# Patient Record
Sex: FEMALE | Race: WHITE | NOT HISPANIC OR LATINO | Employment: FULL TIME | URBAN - METROPOLITAN AREA
[De-identification: names, ages, dates, MRNs, and addresses within clinical notes are randomized per-mention and may not be internally consistent; named-entity substitution may affect disease eponyms.]

---

## 2024-05-20 ENCOUNTER — OFFICE VISIT (OUTPATIENT)
Dept: URGENT CARE | Facility: CLINIC | Age: 24
End: 2024-05-20
Payer: COMMERCIAL

## 2024-05-20 VITALS
DIASTOLIC BLOOD PRESSURE: 67 MMHG | WEIGHT: 155 LBS | SYSTOLIC BLOOD PRESSURE: 103 MMHG | BODY MASS INDEX: 23.49 KG/M2 | RESPIRATION RATE: 18 BRPM | HEART RATE: 102 BPM | HEIGHT: 68 IN | OXYGEN SATURATION: 98 % | TEMPERATURE: 98 F

## 2024-05-20 DIAGNOSIS — S83.92XA SPRAIN OF LEFT KNEE, UNSPECIFIED LIGAMENT, INITIAL ENCOUNTER: Primary | ICD-10-CM

## 2024-05-20 DIAGNOSIS — M25.562 PAIN AND SWELLING OF LEFT KNEE: ICD-10-CM

## 2024-05-20 DIAGNOSIS — M25.462 EFFUSION OF LEFT KNEE JOINT: ICD-10-CM

## 2024-05-20 DIAGNOSIS — M25.462 PAIN AND SWELLING OF LEFT KNEE: ICD-10-CM

## 2024-05-20 PROCEDURE — 73562 X-RAY EXAM OF KNEE 3: CPT | Mod: LT,S$GLB,, | Performed by: RADIOLOGY

## 2024-05-20 PROCEDURE — 99203 OFFICE O/P NEW LOW 30 MIN: CPT | Mod: S$GLB,,, | Performed by: FAMILY MEDICINE

## 2024-05-20 RX ORDER — NORGESTIMATE AND ETHINYL ESTRADIOL 7DAYSX3 28
KIT ORAL
COMMUNITY

## 2024-05-20 RX ORDER — CLORAZEPATE DIPOTASSIUM 7.5 MG/1
7.5 TABLET ORAL
COMMUNITY

## 2024-05-20 RX ORDER — NORTRIPTYLINE HYDROCHLORIDE 10 MG/1
10 CAPSULE ORAL NIGHTLY
COMMUNITY
Start: 2024-04-18

## 2024-05-20 NOTE — PATIENT INSTRUCTIONS
Thank you for allowing our team to take care of you today.  The diagnosis is left knee sprain.  Xray of the knee was done today and read by radiology as resulted below.   Suspect possible injury to the ligaments.  Continue knee brace for support.  Continue local ice for swelling.  No aggravating activities.  Will have you followup with sports medicine/orthopedist for further management. A appointment request has been placed.   Immediate medical provider evaluation if worsens.  Followup here as needed.       Narrative & Impression  EXAM: XR KNEE 3 VIEW LEFT     CLINICAL HISTORY: Knee pain     FINDINGS:  No fracture or dislocation is visible.  There is density consistent with a small joint effusion visible on the lateral view in the suprapatellar bursa.        Impression:     1.  No evidence of osseous injury.  2.  Small effusion visible on the lateral view.     Finalized on: 5/20/2024 2:19 PM By:  Bay HARVEY# [id number]      2024-05-20 14:21:10.386    WES

## 2024-05-20 NOTE — PROGRESS NOTES
"Subjective:      Patient ID: Leanna Escobar is a 24 y.o. female.    Vitals:  height is 5' 8" (1.727 m) and weight is 70.3 kg (154 lb 15.7 oz). Her temperature is 97.6 °F (36.4 °C). Her blood pressure is 103/67 and her pulse is 102. Her respiration is 18 and oxygen saturation is 98%.     Chief Complaint: Knee Injury    23 y/o female here for left knee pain x 1 week with accompanying swelling. No known history of injury or fall. Causing a limp. Doesn't seem as stable. Using a brace. Also, she has taken ibuprofen with no relief. Last dose was at 11am. No prior hx of problems with her left knee.     Knee Injury  This is a new problem. The current episode started in the past 7 days. The problem has been unchanged. Associated symptoms include arthralgias and joint swelling.       Constitution: Negative.   Cardiovascular: Negative.    Respiratory: Negative.     Musculoskeletal:  Positive for joint pain, joint swelling and history of spine disorder. Negative for trauma.   Skin: Negative.    Psychiatric/Behavioral: Negative.        Objective:     Physical Exam   Constitutional: She is oriented to person, place, and time.   Neck: Neck supple.   Cardiovascular: Normal rate, regular rhythm and normal pulses.   Pulmonary/Chest: Effort normal.   Abdominal: Normal appearance.   Musculoskeletal:      Comments: Left knee--No visible discoloration/bruising. Mild anterior knee swelling. No warmth. Tenderness over anterior, superior, lateral and medial surfaces of this knee. Currently no specific joint line tenderness. Pain with active extension and flexion but able to complete full ROM. Pain with weight bearing. Two healing bruised areas lower anterior leg. Nontender. Rest of the lower extremity with no acute abnormality.    Neurological: no focal deficit. She is alert and oriented to person, place, and time.   Skin: Skin is warm.         Comments: Normal turgor. No acute focal rash.    Psychiatric: Her behavior is normal. Mood, " judgment and thought content normal.   Nursing note and vitals reviewed.      Assessment:     1. Sprain of left knee, unspecified ligament, initial encounter    2. Pain and swelling of left knee    3. Effusion of left knee joint        Plan:       Sprain of left knee, unspecified ligament, initial encounter  -     Ambulatory referral/consult to Sports Medicine    Pain and swelling of left knee  -     XR KNEE 3 VIEW LEFT; Future; Expected date: 05/20/2024  -     Ambulatory referral/consult to Sports Medicine    Effusion of left knee joint  -     Ambulatory referral/consult to Sports Medicine          Review of patient's allergies indicates:   Allergen Reactions    Adhesive Blisters, Hives, Itching, Rash and Swelling    Beeswax Hives, Rash and Swelling    Triclosan Itching     SUMMARY: See hpi. Suspect ligament/meniscal injury. Continue knee brace, limiting aggravating activity. Sports medicine evaluation--appreciate. Only takes Ibuprofen if she has to since easy bruiser. Handout on knee sprain with general information given as well.     Patient Instructions   Thank you for allowing our team to take care of you today.  The diagnosis is left knee sprain.  Xray of the knee was done today and read by radiology as resulted below.   Suspect possible injury to the ligaments.  Continue knee brace for support.  Continue local ice for swelling.  No aggravating activities.  Will have you followup with sports medicine/orthopedist for further management. A appointment request has been placed.   Immediate medical provider evaluation if worsens.  Followup here as needed.       Narrative & Impression  EXAM: XR KNEE 3 VIEW LEFT     CLINICAL HISTORY: Knee pain     FINDINGS:  No fracture or dislocation is visible.  There is density consistent with a small joint effusion visible on the lateral view in the suprapatellar bursa.        Impression:     1.  No evidence of osseous injury.  2.  Small effusion visible on the lateral view.      Finalized on: 5/20/2024 2:19 PM By:  Bay HARVEY# [id number]      2024-05-20 14:21:10.386    WES

## 2024-05-23 ENCOUNTER — OFFICE VISIT (OUTPATIENT)
Dept: SPORTS MEDICINE | Facility: CLINIC | Age: 24
End: 2024-05-23
Payer: COMMERCIAL

## 2024-05-23 ENCOUNTER — HOSPITAL ENCOUNTER (OUTPATIENT)
Dept: RADIOLOGY | Facility: HOSPITAL | Age: 24
Discharge: HOME | End: 2024-05-23
Attending: ORTHOPAEDIC SURGERY
Payer: COMMERCIAL

## 2024-05-23 VITALS — BODY MASS INDEX: 23.49 KG/M2 | WEIGHT: 155 LBS | HEIGHT: 68 IN

## 2024-05-23 DIAGNOSIS — M25.562 LEFT KNEE PAIN, UNSPECIFIED CHRONICITY: ICD-10-CM

## 2024-05-23 DIAGNOSIS — M22.2X2 PATELLOFEMORAL PAIN SYNDROME OF LEFT KNEE: Primary | ICD-10-CM

## 2024-05-23 DIAGNOSIS — M76.32 ILIOTIBIAL BAND SYNDROME OF LEFT SIDE: ICD-10-CM

## 2024-05-23 PROCEDURE — 3008F BODY MASS INDEX DOCD: CPT | Mod: CPTII,S$GLB,, | Performed by: ORTHOPAEDIC SURGERY

## 2024-05-23 PROCEDURE — 99999 PR PBB SHADOW E&M-EST. PATIENT-LVL III: CPT | Mod: PBBFAC,,, | Performed by: ORTHOPAEDIC SURGERY

## 2024-05-23 PROCEDURE — 73564 X-RAY EXAM KNEE 4 OR MORE: CPT | Mod: 26,LT,, | Performed by: RADIOLOGY

## 2024-05-23 PROCEDURE — 20610 DRAIN/INJ JOINT/BURSA W/O US: CPT | Mod: LT,S$GLB,, | Performed by: ORTHOPAEDIC SURGERY

## 2024-05-23 PROCEDURE — 73562 X-RAY EXAM OF KNEE 3: CPT | Mod: TC,PN,RT

## 2024-05-23 PROCEDURE — 1159F MED LIST DOCD IN RCRD: CPT | Mod: CPTII,S$GLB,, | Performed by: ORTHOPAEDIC SURGERY

## 2024-05-23 PROCEDURE — 99204 OFFICE O/P NEW MOD 45 MIN: CPT | Mod: 25,S$GLB,, | Performed by: ORTHOPAEDIC SURGERY

## 2024-05-23 RX ADMIN — METHYLPREDNISOLONE ACETATE 40 MG: 40 INJECTION, SUSPENSION INTRA-ARTICULAR; INTRALESIONAL; INTRAMUSCULAR; SOFT TISSUE at 10:05

## 2024-05-23 NOTE — PROCEDURES
Large Joint Aspiration/Injection: L knee    Date/Time: 5/23/2024 10:30 AM    Performed by: Roque Stevenson MD  Authorized by: Roque Stevenson MD    Consent Done?:  Yes (Verbal)  Indications:  Joint swelling and pain  Site marked: the procedure site was marked    Timeout: prior to procedure the correct patient, procedure, and site was verified    Prep: patient was prepped and draped in usual sterile fashion      Local anesthesia used?: Yes    Anesthesia:  Local infiltration  Local anesthetic:  Bupivacaine 0.5% without epinephrine, lidocaine 1% without epinephrine and topical anesthetic    Details:  Needle Size:  22 G  Ultrasonic Guidance for needle placement?: No    Approach:  Superior  Location:  Knee  Site:  L knee  Medications:  40 mg methylPREDNISolone acetate 40 mg/mL  Patient tolerance:  Patient tolerated the procedure well with no immediate complications     2cc 1% lidocaine plain, 2cc 0.5% marcaine plain, 0.5cc 80mg methylprednisolone    Procedure Note:  We discussed the risk and benefits of injections, including pain, infection, bleeding, damage to adjacent structures, risk of reaction to injection. We discussed the steroid/cortisone injections will not heal the problem but mat help decrease inflammation and help with symptoms. We discussed the risk of repeated injections. The patient expressed understanding and wanted to proceed with the injection. We performed a timeout to verify the proper patient, proper procedure, and the proper site. The injection site was prepared in a sterile fashion. The patient tolerated it well and there were no complication. We did discuss with the patient that steroid injections can cause some increase in blood sugar and blood pressure for up to a week after the injection.

## 2024-05-23 NOTE — PATIENT INSTRUCTIONS
Assessment:  Leanna Escobar is a  24 y.o. female    diadetic with a chief complaint of Injury of the Left Knee    L knee PFPS  Patella Dagmar   IT band syndrome    Encounter Diagnoses   Name Primary?    Left knee pain, unspecified chronicity     Patellofemoral pain syndrome of left knee Yes    Iliotibial band syndrome of left side       Plan:  I recommend proceeding with left knee intra-articular corticosteroid injection. The patient is in agreement with this plan. 40 administered today  Procedure performed today and patient tolerated the procedure well with no immediate complications.   Recommend PT to improve strength and function  Continue OTC ibuprofen as needed  Work with PT Julio on IT Band syndrome today after visit.    Follow-up: 6 weeks or sooner if there are any problems between now and then.    Leave Review:   Google: Leave Google Review  Healthgrades: Leave Healthgrades Review    After Hours Number: [phone]        DEFORMITY/INFLAMMATION/JOINT SWELLING/PAIN/TENDERNESS

## 2024-05-23 NOTE — PROGRESS NOTES
Patient ID: Leanna Escobar  YOB: 2000  MRN: [id number]    Chief Complaint: Injury of the Left Knee    Referred By: PCP    History of Present Illness: Leanna Escobar is a  24 y.o. female    diadetic with a chief complaint of Injury of the Left Knee    Patient presents today for evaluation of left knee pain and swelling that has been present for 10 days without any specific injury. She denies any known hx of autoimmune disorders. She has an extensive hx of spinal fusions dating back to 2019, these were done in [facility]. She reports she was at work when she started to notice the pain while walking around. She denies any twisting or falls, endorses insidious onset. She has taken ibuprofen as needed for the pain and iced a few times with mild improvement. She has the most pain with general weightbearing and walking longer distances at work. She has not had any CSI's or formal PT for this issue.     HPI    Past Medical History:   Past Medical History:   Diagnosis Date    Breakdown (mechanical) of cranial or spinal infusion catheter, initial encounter     History of tonsillectomy      Past Surgical History:   Procedure Laterality Date    SPINAL FUSION      TONSILLECTOMY       Family History   Problem Relation Name Age of Onset    Hyperlipidemia Father       Social History     Socioeconomic History    Marital status:    Tobacco Use    Smoking status: Every Day     Types: Vaping with nicotine     Passive exposure: Never    Smokeless tobacco: Never   Substance and Sexual Activity    Alcohol use: Yes    Drug use: Never    Sexual activity: Yes     Partners: Male     Birth control/protection: I.U.D.     Medication List with Changes/Refills   Current Medications    CLORAZEPATE (TRANXENE) 3.75 MG TAB    Take 3.75 mg by mouth 3 (three) times daily.    CLORAZEPATE (TRANXENE) 7.5 MG TAB    Take 7.5 mg by mouth.    DULOXETINE (CYMBALTA) 60 MG CAPSULE    Take 60 mg by mouth once daily.     NORGESTIMATE-ETHINYL ESTRADIOL (ORTHO TRI-CYCLEN,TRI-SPRINTEC) 0.18/0.215/0.25 MG-35 MCG (28) TABLET    Take by mouth.    NORTRIPTYLINE (PAMELOR) 10 MG CAPSULE    Take 10 mg by mouth every evening.    ONDANSETRON (ZOFRAN) 8 MG TABLET         Review of patient's allergies indicates:   Allergen Reactions    Adhesive Blisters, Hives, Itching, Rash and Swelling    Beeswax Hives, Rash and Swelling    Triclosan Itching     ROS    Physical Exam:   Body mass index is 23.57 kg/m².  There were no vitals filed for this visit.   GENERAL: Well appearing, appropriate for stated age, no acute distress.  CARDIOVASCULAR: Pulses regular by peripheral palpation.  PULMONARY: Respirations are even and non-labored.  NEURO: Awake, alert, and oriented x 3.  PSYCH: Mood & affect are appropriate.  HEENT: Head is normocephalic and atraumatic.  Ortho/SPM Exam  Left Knee  Mild effusion noted on inspection  TTP lateral joint line, IT band, lateral patella border    ROM 0-130    MMT  Quads 5/5  Hamstring 5/5  Hip abduction 5/5    ACL stable  MCL stable  LCL stable  PCL stable    McMurrays (+)    Intact EHL, FHL, gastrocsoleus, and tibialis anterior. Sensation intact to light touch in superficial peroneal, deep peroneal, tibial, sural, and saphenous nerve distributions. Foot warm and well perfused with capillary refill of less than 2 seconds and palpable pedal pulses.      Imaging:    X-ray Knee Ortho Left with Flexion  Narrative: EXAM:  XR KNEE ORTHO LEFT WITH FLEXION    CLINICAL HISTORY:    Left knee joint pain    TECHNIQUE: 4 views of the left knee.    COMPARISON: 05/20/2024 x-ray    FINDINGS:    Bone density and architecture are normal. No acute findings.  Impression:  Negative study    Finalized on: 5/23/2024 10:53 AM By:  Sebas Pulliam MD  [facility]# [id number]      2024-05-23 10:55:43.010    BROLIVIA      Relevant imaging results reviewed and interpreted by me, discussed with the patient and / or family today.     Other Tests:       Patient  Instructions   Assessment:  Leanna Escobar is a  24 y.o. female    diadetic with a chief complaint of Injury of the Left Knee    L knee PFPS  Patella Dagmar   IT band syndrome    Encounter Diagnoses   Name Primary?    Left knee pain, unspecified chronicity     Patellofemoral pain syndrome of left knee Yes    Iliotibial band syndrome of left side       Plan:  I recommend proceeding with left knee intra-articular corticosteroid injection. The patient is in agreement with this plan. 40 administered today  Procedure performed today and patient tolerated the procedure well with no immediate complications.   Recommend PT to improve strength and function  Continue OTC ibuprofen as needed  Work with PT Julio on IT Band syndrome today after visit.    Follow-up: 6 weeks or sooner if there are any problems between now and then.    Leave Review:   Google: Leave Google Review  Healthgrades: Leave Healthgrades Review    After Hours Number: [phone]       Provider Note/Medical Decision Making:       I discussed worrisome and red flag signs and symptoms with the patient. The patient expressed understanding and agreed to alert me immediately or to go to the emergency room if they experience any of these.   Treatment plan was developed with input from the patient/family, and they expressed understanding and agreement with the plan. All questions were answered today.          Roque Stevenson MD  Orthopaedic Surgery & Sports Medicine       Disclaimer: This note was prepared using a voice recognition system and is likely to have sound alike errors within the text.     I, Fox Payne, acted as a scribe for Roque Stevenson MD for the duration of this office visit.

## 2024-05-29 ENCOUNTER — CLINICAL SUPPORT (OUTPATIENT)
Facility: HOSPITAL | Age: 24
End: 2024-05-29
Payer: COMMERCIAL

## 2024-05-29 DIAGNOSIS — R52 PAIN AGGRAVATED BY ACTIVITIES OF DAILY LIVING: ICD-10-CM

## 2024-05-29 DIAGNOSIS — R29.898 DECREASED STRENGTH OF LOWER EXTREMITY: Primary | ICD-10-CM

## 2024-05-29 DIAGNOSIS — M22.2X2 PATELLOFEMORAL PAIN SYNDROME OF LEFT KNEE: ICD-10-CM

## 2024-05-29 PROCEDURE — 97161 PT EVAL LOW COMPLEX 20 MIN: CPT | Mod: PN | Performed by: PHYSICAL THERAPIST

## 2024-05-29 PROCEDURE — 97110 THERAPEUTIC EXERCISES: CPT | Mod: PN | Performed by: PHYSICAL THERAPIST

## 2024-05-29 NOTE — PROGRESS NOTES
[facility]   Physical Therapy Initial Evaluation      Name: Leanna Escobar  Clinic Number: [id number]    Therapy Diagnosis:   Encounter Diagnosis   Name Primary?    Patellofemoral pain syndrome of left knee         Physician: Roque Stevenson MD    Physician Orders: PT Eval and Treat   Medical Diagnosis from Referral: Patellofemoral pain syndrome of left knee [M22.2X2]   Evaluation Date: 5/29/2024  Authorization Period Expiration: 12/31/2024  Plan of Care Expiration: 8/29/24  Progress Note Due: 6/29/24  Visit # / Visits authorized: 1/ 1     FOTO:  Goal: 71%  -Intake: 40%  -Status: incomplete  -Discharge: incomplete    Precautions: Standard     Time In: 1115  Time Out: 1215  Total Appointment Time (timed & untimed codes): 60 minutes    Subjective     Date of onset: 2 weeks ago    History of current condition - Leanna reports: Anterior knee pain. Better since steroid shot. Pain started after walking at work. History of 3 spinal surgeries. Is now now fused T3- 12. Most recent spine surgery was in 2019. Back hurts all day. Deep achy pain. She also has a history of nerve pain. Upper/middle back.    Back hurts all the time.    Falls:     Imaging: [] Xray [] MRI [] CT: Performed on:     Pain:  Current 4/10, worst 6/10, best 0/10   Location: [] Right   [x] Left:    Description: tenderness, sharp  Aggravating Factors: Stairs, squatting.  Easing Factors: activity avoidance, rest    Prior Therapy: Yes  Social History:    Occupation: Works at [employer]  Prior Level of Function: Limited due to back pain. She used to do some weight lifting, but stopped because of back pain.  Current Level of Function: Walks a good bit at work, normal walking throughout the day.     Patients goals: Get back to normal function. Potentially get back to lifting.     Medical History:   Past Medical History:   Diagnosis Date    Breakdown (mechanical) of cranial or spinal infusion catheter, initial encounter      History of tonsillectomy        Surgical History:   Leanna Escobar  has a past surgical history that includes Spinal fusion and Tonsillectomy.    Medications:   Leanna has a current medication list which includes the following prescription(s): clorazepate, clorazepate, duloxetine, norgestimate-ethinyl estradiol, nortriptyline, and ondansetron.    Allergies:   Review of patient's allergies indicates:   Allergen Reactions    Adhesive Blisters, Hives, Itching, Rash and Swelling    Beeswax Hives, Rash and Swelling    Triclosan Itching        Objective      Observation:   POSTURE:    Apparent lateral tibial torsion on R vs more medial tibial torsion on L  GAIT:   L foot in toeing      FUNCTIONAL MOVEMENT PATTERNS  Movement Analysis Notes   Squat []Functional  [x]Dysfunctional:  [x]Painful  []Non-Painful    Asymmetrical       MOTOR CONTROL TESTS:    Right  (spine) Left  Goal   Lower extremity      Active straight leg raise [] Positive  [x] Negative  Comments: [x] Positive  [] Negative  Comments: Negative B    Prone knee bend [] Positive  [x] Negative  Comments: [x] Positive  [] Negative  Comments: Negative B    Prone hip rotation [] Positive  [x] Negative  Comments: [x] Positive  [] Negative  Comments:    Bent knee fallout [] Positive  [x] Negative  Comments: [x] Positive  [] Negative  Comments: Negative B    Supine march [] Positive  [x] Negative  Comments: [x] Positive  [] Negative  Comments:    Seated knee extension [] Positive  [x] Negative  Comments: [x] Positive  [] Negative  Comments: Negative B         Balance:    RIGHT   LEFT   Goal   Single Leg EC 10 s EC 10s 30 seconds      ,   Lower extremity reflexes:  Reflex Left Right   Patella (L2-4) 3+ 3+   Hamstring (L5) 3+ 3+   Achilles (S1) 3+ 3+   Murphy Negative Negative   Babinski Negative Negative   Clonus Negative Negative     ,   Lower extremity myotomes  Neuro Testing Right   Left     L2 (hip flexion) 5/5 5/5   L3 (knee extension) 5/5 5/5   L4 (ankle DF) 5/5 5/5  "  L5 (great toe extension) 5/5 5/5   S1 (plantarflexion) 5/5 5/5    ,     Lower  Limb Neurodynamic testing:  NT today    RANGE OF MOTION:   Lumbar ROM Right  (spine) Left   Pain/Dysfunction with Movement Goal   Lumbar Flexion (60º) 50% --- Flat back    Lumbar Extension (30º) 25% ---     Lumbar Rotation 50% 50%         Hip AROM/PROM Right Left Pain/Dysfunction with Movement Goal   Hip Flexion (120º) 130 130     Hip Extension (30º)       Hip Abduction (45º)       Hip IR (45º) 40 50     Hip ER (45º) 40 50         Knee AROM/PROM Right Left Pain/Dysfunction with Movement Goal   Knee Flexion (135º) 140 140 Discomfort    Knee Extension (0º) 5 5         Ankle/Foot AROM/PROM Right Left Pain/Dysfunction with Movement Goal   Dorsiflexion (20º OKC, 4" CKC) -5 -5     Plantarflexion (50º)       Inversion (35º) 35 35     Eversion (15º) 15 15          STRENGTH:   L/E MMT Right  (spine) Left Pain/Dysfunction with Movement Goal   Hip Flexion  4+/5 3/5 Unable to maintain test position 4+/5 B   Hip Extension  3/5 3/5 Significant lumbar extension 4+/5 B   Hip Abduction  4+/5 4-/5  4+/5 B   Knee Extension 5/5 4+/5 Pain 5/5 B   Knee Flexion 5/5 4+/5  5/5 B   Hip ER 4+/5 4+/5  4+/5 B   Ankle PF 28 25  5/5 B        MUSCLE LENGTH:   Muscle Tested  Right Left  Limitation Goal   ITB / TFL [x] Normal  [] Limited [x] Normal  [] Limited  Normal B   Quadriceps [x] Normal  [] Limited [x] Normal  [] Limited  Normal B   Hamstrings  [] Normal  [] Limited [] Normal  [] Limited  Normal B         Joint mobility:  Impaired Ankle AP mobility      SPECIAL TESTS:    Right  (spine) Left  Goal   PRIYA  [] Positive    [x] Negative [x] Positive    [] Negative Negative B    FADIR  [] Positive    [x] Negative [] Positive    [x] Negative Negative B    Scour  [] Positive    [x] Negative [] Positive    [x] Negative Negative B    Elizondo Compression Test [] Positive    [x] Negative [] Positive    [x] Negative Negative B           SENSATION  [x] Intact to Light Touch "   [] Impaired:      PALPATION: Structures: Increased tenderness to palpation of:          Function:     Intake Outcome Measure for FOTO knee Survey    Therapist reviewed FOTO scores for Leanna Outlaw on 5/29/2024.   FOTO documents entered into EPIC - see Media section.    Intake Score: 40%         Treatment     Total Treatment time (time-based codes) separate from Evaluation: (10) minutes     Leanna received the treatments listed below:      CPT Code Intervention Date/Notes  5/29     Manual Therapy     TE Bracing with march 10X 10S   TE Bridge with TB 10 X 10S   TE Side lying hip abduction 10 X 10S     *** minutes of Therapeutic Exercise (TE) to develop strength, endurance, ROM, and flexibility. (28153)  *** minutes of Manual therapy (MT) to improve pain and ROM. (47230)  *** minutes of Neuromuscular Re-Education (NMR)  to improve: Balance, Coordination, Kinesthetic, Sense, Proprioception, and Posture. (55090)  *** minutes of Therapeutic Activities (TA) to improve functional performance. (75701)  Unattended Electrical Stimulation (ES) for muscle performance and/or pain modulation. (38782)  Vasopneumatic Device Therapy () for management of swelling/edema. (82664)  BFR: Blood flow restriction applied during exercise  NP: Not Performed    Patient Education and Home Exercises     Education provided: (***) minutes  {PATIENT EDUCATION (Optional):07795}  Activity Modifications: ***    Written Home Exercises Provided: yes.  Exercises were reviewed and Leanna was able to demonstrate them prior to the end of the session.  Leanna demonstrated {Desc; good/fair/poor:22609} understanding of the education provided. See EMR under Patient Instructions for exercises provided during therapy sessions.    Assessment     Leanna is a 24 y.o. female referred to outpatient Physical Therapy with a medical diagnosis of Patellofemoral pain syndrome of left knee [M22.2X2] . Clinical exam is consistent with referring diagnosis.     Problem  "List:  Lumbar extension/rotation syndrome  Hip adduction/internal rotation syndrome  Lumbopelvic-hip complex weakness/movement dysfunction  Hamstring dominance  Patella franny    Pt prognosis is Excellent  Pt will benefit from skilled outpatient Physical Therapy to address the deficits stated above and in the chart below, provide pt/family education, and to maximize pt's level of independence.     Plan of care discussed with patient: Yes  Pt's spiritual, cultural and educational needs considered and patient is agreeable to the plan of care and goals as stated below:     Anticipated Barriers for therapy: co-morbidities    Medical Necessity is demonstrated by the following   History  Co-morbidities and personal factors that may impact the plan of care [] LOW: no personal factors / co-morbidities  [x] MODERATE: 1-2 personal factors / co-morbidities  [] HIGH: 3+ personal factors / co-morbidities    Moderate / High Support Documentation: See patient medical history and objective assessment     Examination  Body Structures and Functions, activity limitations and participation restrictions that may impact the plan of care [] LOW: addressing 1-2 elements  [x] MODERATE: 3+ elements  [] HIGH: 4+ elements (please support below)    Moderate / High Support Documentation: See patient medical history and objective assessment     Clinical Presentation [] LOW: stable  [x] MODERATE: Evolving  [] HIGH: Unstable     Decision Making/ Complexity Score: moderate         Short Term Goals:  {numbers 1-12:63474::"6"} weeks Status  Date Met   PAIN: Pt will report worst pain of ***/10 in order to progress toward max functional ability and improve quality of life. [x] Progressing  [] Met  [] Not Met    FUNCTION: Patient will demonstrate improved function as indicated by a functional score improvement of at least 5 points on FOTO. [x] Progressing  [] Met  [] Not Met    MOBILITY: Patient will improve AROM to 50% of stated goals, listed in objective " "measures above, in order to progress towards independence with functional activities.  [x] Progressing  [] Met  [] Not Met    STRENGTH: Patient will improve strength to 50% of stated goals, listed in objective measures above, in order to progress towards independence with functional activities. [x] Progressing  [] Met  [] Not Met    POSTURE: Patient will correct postural deviations in sitting and standing, to decrease pain and promote long term stability.  [x] Progressing  [] Met  [] Not Met    GAIT: Patient will demonstrate improved gait mechanics including *** in order to improve functional mobility, improve quality of life, and decrease risk of further injury or fall.  [x] Progressing  [] Met  [] Not Met    HEP: Patient will demonstrate independence with HEP in order to progress toward functional independence. [x] Progressing  [] Met  [] Not Met    *** [x] Progressing  [] Met  [] Not Met      Long Term Goals:  {numbers 1-12:26359::"12"} weeks Status Date Met   PAIN: Pt will report worst pain of ***/10 in order to progress toward max functional ability and improve quality of life [x] Progressing  [] Met  [] Not Met    FUNCTION: Patient will demonstrate improved function as indicated by a FOTO functional score improvement as listed in header. [x] Progressing  [] Met  [] Not Met    MOBILITY: Patient will improve AROM to stated goals, listed in objective measures above, in order to return to maximal functional potential and improve quality of life. {Set ROM goals} [x] Progressing  [] Met  [] Not Met    STRENGTH: Patient will improve strength to stated goals, listed in objective measures above, in order to improve functional independence and quality of life. {Set strength goals} [x] Progressing  [] Met  [] Not Met    GAIT: Patient will demonstrate normalized gait mechanics with minimal compensation in order to return to PLOF. [x] Progressing  [] Met  [] Not Met    Patient will return to normal ADL's, IADL's, community " "involvement, recreational activities, and work-related activities with less than or equal to ***/10 pain and maximal function.  [x] Progressing  [] Met  [] Not Met    *** [x] Progressing  [] Met  [] Not Met      Plan   Plan of care Certification: 5/29/2024 to ***.    Outpatient Physical Therapy {Numbers; 1-5:85141::"2"} times weekly for {numbers 1-12:10966::"12"} weeks to include any combination of the following interventions: virtual visits, dry needling, modalities, electrical stimulation (IFC, Pre-Mod, Attended with Functional Dry Needling), {TX PLAN:70050::"Cervical/Lumbar Traction","Gait Training","Manual Therapy","Neuromuscular Re-ed","Patient Education","Self Care","Therapeutic Exercise","Therapeutic Activites"}     Julio Day, PT, DPT      I CERTIFY THE NEED FOR THESE SERVICES FURNISHED UNDER THIS PLAN OF TREATMENT AND WHILE UNDER MY CARE   Physician's comments:     Physician's Signature: ___________________________________________________     "

## 2024-05-30 PROBLEM — R29.898 DECREASED STRENGTH OF LOWER EXTREMITY: Status: ACTIVE | Noted: 2024-05-30

## 2024-05-30 PROBLEM — R52 PAIN AGGRAVATED BY ACTIVITIES OF DAILY LIVING: Status: ACTIVE | Noted: 2024-05-30

## 2024-05-30 PROBLEM — M22.2X2 PATELLOFEMORAL PAIN SYNDROME OF LEFT KNEE: Status: ACTIVE | Noted: 2024-05-30

## 2024-05-30 NOTE — PLAN OF CARE
[facility]   Physical Therapy Initial Evaluation      Name: Leanna Escobar  Clinic Number: [id number]    Therapy Diagnosis:   Encounter Diagnoses   Name Primary?    Patellofemoral pain syndrome of left knee     Decreased strength of lower extremity Yes    Pain aggravated by activities of daily living         Physician: Roque Stevenson MD    Physician Orders: PT Eval and Treat   Medical Diagnosis from Referral: Patellofemoral pain syndrome of left knee [M22.2X2]   Evaluation Date: 5/29/2024  Authorization Period Expiration: 12/31/2024  Plan of Care Expiration: 8/29/24  Progress Note Due: 6/29/24  Visit # / Visits authorized: 1/ 1     FOTO:  Goal: 71%  -Intake: 40%  -Status: incomplete  -Discharge: incomplete    Precautions: Standard     Time In: 1115  Time Out: 1215  Total Appointment Time (timed & untimed codes): 60 minutes    Subjective     Date of onset: 2 weeks ago    History of current condition - Leanna reports: Anterior knee pain. Better since steroid shot. Pain started after walking at work. History of 3 spinal surgeries. Is now now fused T3- 12. Most recent spine surgery was in 2019. Back hurts all day. Deep achy pain. She also has a history of nerve pain. Upper/middle back.    Back hurts all the time.    Falls:     Imaging: [] Xray [] MRI [] CT: Performed on:     Pain:  Current 4/10, worst 6/10, best 0/10   Location: [] Right   [x] Left:    Description: tenderness, sharp  Aggravating Factors: Stairs, squatting.  Easing Factors: activity avoidance, rest    Prior Therapy: Yes  Social History:    Occupation: Works at [employer]  Prior Level of Function: Limited due to back pain. She used to do some weight lifting, but stopped because of back pain.  Current Level of Function: Walks a good bit at work, normal walking throughout the day.     Patients goals: Get back to normal function. Potentially get back to lifting.     Medical History:   Past Medical History:   Diagnosis  Date    Breakdown (mechanical) of cranial or spinal infusion catheter, initial encounter     History of tonsillectomy        Surgical History:   Leanna Escobar  has a past surgical history that includes Spinal fusion and Tonsillectomy.    Medications:   Leanna has a current medication list which includes the following prescription(s): clorazepate, clorazepate, duloxetine, norgestimate-ethinyl estradiol, nortriptyline, and ondansetron.    Allergies:   Review of patient's allergies indicates:   Allergen Reactions    Adhesive Blisters, Hives, Itching, Rash and Swelling    Beeswax Hives, Rash and Swelling    Triclosan Itching        Objective      Observation:   POSTURE:    Apparent lateral tibial torsion on R vs more medial tibial torsion on L  GAIT:   L foot in toeing      FUNCTIONAL MOVEMENT PATTERNS  Movement Analysis Notes   Squat []Functional  [x]Dysfunctional:  [x]Painful  []Non-Painful    Asymmetrical       MOTOR CONTROL TESTS:    Right  (spine) Left  Goal   Lower extremity      Active straight leg raise [] Positive  [x] Negative  Comments: [x] Positive  [] Negative  Comments: Negative B    Prone knee bend [] Positive  [x] Negative  Comments: [x] Positive  [] Negative  Comments: Negative B    Prone hip rotation [] Positive  [x] Negative  Comments: [x] Positive  [] Negative  Comments:    Bent knee fallout [] Positive  [x] Negative  Comments: [x] Positive  [] Negative  Comments: Negative B    Supine march [] Positive  [x] Negative  Comments: [x] Positive  [] Negative  Comments:    Seated knee extension [] Positive  [x] Negative  Comments: [x] Positive  [] Negative  Comments: Negative B         Balance:    RIGHT   LEFT   Goal   Single Leg EC 10 s EC 10s 30 seconds      ,   Lower extremity reflexes:  Reflex Left Right   Patella (L2-4) 3+ 3+   Hamstring (L5) 3+ 3+   Achilles (S1) 3+ 3+   Murphy Negative Negative   Babinski Negative Negative   Clonus Negative Negative     ,   Lower extremity myotomes  Neuro Testing  "Right   Left     L2 (hip flexion) 5/5 5/5   L3 (knee extension) 5/5 5/5   L4 (ankle DF) 5/5 5/5   L5 (great toe extension) 5/5 5/5   S1 (plantarflexion) 5/5 5/5    ,     Lower  Limb Neurodynamic testing:  NT today    RANGE OF MOTION:   Lumbar ROM Right  (spine) Left   Pain/Dysfunction with Movement Goal   Lumbar Flexion (60º) 50% --- Flat back    Lumbar Extension (30º) 25% ---     Lumbar Rotation 50% 50%         Hip AROM/PROM Right Left Pain/Dysfunction with Movement Goal   Hip Flexion (120º) 130 130     Hip Extension (30º)       Hip Abduction (45º)       Hip IR (45º) 40 50     Hip ER (45º) 40 50         Knee AROM/PROM Right Left Pain/Dysfunction with Movement Goal   Knee Flexion (135º) 140 140 Discomfort    Knee Extension (0º) 5 5         Ankle/Foot AROM/PROM Right Left Pain/Dysfunction with Movement Goal   Dorsiflexion (20º OKC, 4" CKC) -5 -5     Plantarflexion (50º)       Inversion (35º) 35 35     Eversion (15º) 15 15          STRENGTH:   L/E MMT Right  (spine) Left Pain/Dysfunction with Movement Goal   Hip Flexion  4+/5 3/5 Unable to maintain test position 4+/5 B   Hip Extension  3/5 3/5 Significant lumbar extension 4+/5 B   Hip Abduction  4+/5 4-/5  4+/5 B   Knee Extension 5/5 4+/5 Pain 5/5 B   Knee Flexion 5/5 4+/5  5/5 B   Hip ER 4+/5 4+/5  4+/5 B   Ankle PF 28 25  5/5 B        MUSCLE LENGTH:   Muscle Tested  Right Left  Limitation Goal   ITB / TFL [x] Normal  [] Limited [x] Normal  [] Limited  Normal B   Quadriceps [x] Normal  [] Limited [x] Normal  [] Limited  Normal B   Hamstrings  [] Normal  [] Limited [] Normal  [] Limited  Normal B         Joint mobility:  Impaired Ankle AP mobility      SPECIAL TESTS:    Right  (spine) Left  Goal   PRIYA  [] Positive    [x] Negative [x] Positive    [] Negative Negative B    FADIR  [] Positive    [x] Negative [] Positive    [x] Negative Negative B    Scour  [] Positive    [x] Negative [] Positive    [x] Negative Negative B    Elizondo Compression Test [] Positive    [x] " Negative [] Positive    [x] Negative Negative B           SENSATION  [x] Intact to Light Touch   [] Impaired:      PALPATION: Structures: Increased tenderness to palpation of:          Function:     Intake Outcome Measure for FOTO knee Survey    Therapist reviewed FOTO scores for Leanna Outlaw on 5/29/2024.   FOTO documents entered into [employer] - see Media section.    Intake Score: 40%         Treatment     Total Treatment time (time-based codes) separate from Evaluation: (10) minutes     Leanna received the treatments listed below:      CPT Code Intervention Date/Notes  5/29     Manual Therapy     TE Bracing with march 10X 10S   TE Bridge with TB 10 X 10S   TE Side lying hip abduction 10 X 10S     10 minutes of Therapeutic Exercise (TE) to develop strength, endurance, ROM, and flexibility. (64894)  00 minutes of Manual therapy (MT) to improve pain and ROM. (31617)  00 minutes of Neuromuscular Re-Education (NMR)  to improve: Balance, Coordination, Kinesthetic, Sense, Proprioception, and Posture. (62471)  00 minutes of Therapeutic Activities (TA) to improve functional performance. (16216)  Unattended Electrical Stimulation (ES) for muscle performance and/or pain modulation. (20346)  Vasopneumatic Device Therapy () for management of swelling/edema. (03341)  BFR: Blood flow restriction applied during exercise  NP: Not Performed    Patient Education and Home Exercises     Education provided: (5) minutes  Physical therapy diagnosis, prognosis, and plan of care.   Activity Modifications: None    Written Home Exercises Provided: yes.  Exercises were reviewed and Leanna was able to demonstrate them prior to the end of the session.  Leanna demonstrated good  understanding of the education provided. See EMR under Patient Instructions for exercises provided during therapy sessions.    Assessment     Leanna is a 24 y.o. female referred to outpatient Physical Therapy with a medical diagnosis of Patellofemoral pain syndrome of  left knee [M22.2X2] . Clinical exam is consistent with referring diagnosis.     Problem List:  Lumbar extension/rotation syndrome  Hip adduction/internal rotation syndrome  Lumbopelvic-hip complex weakness/movement dysfunction  Hamstring dominance  Patella franny    Pt prognosis is Excellent  Pt will benefit from skilled outpatient Physical Therapy to address the deficits stated above and in the chart below, provide pt/family education, and to maximize pt's level of independence.     Plan of care discussed with patient: Yes  Pt's spiritual, cultural and educational needs considered and patient is agreeable to the plan of care and goals as stated below:     Anticipated Barriers for therapy: co-morbidities    Medical Necessity is demonstrated by the following   History  Co-morbidities and personal factors that may impact the plan of care [] LOW: no personal factors / co-morbidities  [x] MODERATE: 1-2 personal factors / co-morbidities  [] HIGH: 3+ personal factors / co-morbidities    Moderate / High Support Documentation: See patient medical history and objective assessment     Examination  Body Structures and Functions, activity limitations and participation restrictions that may impact the plan of care [] LOW: addressing 1-2 elements  [x] MODERATE: 3+ elements  [] HIGH: 4+ elements (please support below)    Moderate / High Support Documentation: See patient medical history and objective assessment     Clinical Presentation [] LOW: stable  [x] MODERATE: Evolving  [] HIGH: Unstable     Decision Making/ Complexity Score: moderate         Short Term Goals:  6 weeks Status  Date Met   PAIN: Pt will report worst pain of 4/10 in order to progress toward max functional ability and improve quality of life. [x] Progressing  [] Met  [] Not Met    FUNCTION: Patient will demonstrate improved function as indicated by a functional score improvement of at least 5 points on FOTO. [x] Progressing  [] Met  [] Not Met    MOBILITY: Patient  will improve AROM to 50% of stated goals, listed in objective measures above, in order to progress towards independence with functional activities.  [x] Progressing  [] Met  [] Not Met    STRENGTH: Patient will improve strength to 50% of stated goals, listed in objective measures above, in order to progress towards independence with functional activities. [x] Progressing  [] Met  [] Not Met    POSTURE: Patient will correct postural deviations in sitting and standing, to decrease pain and promote long term stability.  [x] Progressing  [] Met  [] Not Met    GAIT: Patient will demonstrate improved gait mechanics including more symmetrical foot progression angle in order to improve functional mobility, improve quality of life, and decrease risk of further injury or fall.  [x] Progressing  [] Met  [] Not Met    HEP: Patient will demonstrate independence with HEP in order to progress toward functional independence. [x] Progressing  [] Met  [] Not Met      Long Term Goals:  12 weeks Status Date Met   PAIN: Pt will report worst pain of 0/10 in order to progress toward max functional ability and improve quality of life [x] Progressing  [] Met  [] Not Met    FUNCTION: Patient will demonstrate improved function as indicated by a FOTO functional score improvement as listed in header. [x] Progressing  [] Met  [] Not Met    MOBILITY: Patient will improve AROM to stated goals, listed in objective measures above, in order to return to maximal functional potential and improve quality of life.  [x] Progressing  [] Met  [] Not Met    STRENGTH: Patient will improve strength to stated goals, listed in objective measures above, in order to improve functional independence and quality of life.  [x] Progressing  [] Met  [] Not Met    GAIT: Patient will demonstrate normalized gait mechanics with minimal compensation in order to return to PLOF. [x] Progressing  [] Met  [] Not Met    Patient will return to normal ADL's, IADL's, community  involvement, recreational activities, and work-related activities with less than or equal to 0/10 pain and maximal function.  [x] Progressing  [] Met  [] Not Met      Plan   Plan of care Certification: 5/29/2024 to 8/29/24.    Outpatient Physical Therapy 2 times weekly for 12 weeks to include any combination of the following interventions: virtual visits, dry needling, modalities, electrical stimulation (IFC, Pre-Mod, Attended with Functional Dry Needling), Cervical/Lumbar Traction, Gait Training, Manual Therapy, Neuromuscular Re-ed, Patient Education, Self Care, Therapeutic Exercise, and Therapeutic Activites     Julio Day, PT, DPT      I CERTIFY THE NEED FOR THESE SERVICES FURNISHED UNDER THIS PLAN OF TREATMENT AND WHILE UNDER MY CARE   Physician's comments:     Physician's Signature: ___________________________________________________

## 2024-06-05 ENCOUNTER — CLINICAL SUPPORT (OUTPATIENT)
Facility: HOSPITAL | Age: 24
End: 2024-06-05
Payer: COMMERCIAL

## 2024-06-05 DIAGNOSIS — R29.898 DECREASED STRENGTH OF LOWER EXTREMITY: Primary | ICD-10-CM

## 2024-06-05 DIAGNOSIS — R52 PAIN AGGRAVATED BY ACTIVITIES OF DAILY LIVING: ICD-10-CM

## 2024-06-05 DIAGNOSIS — M22.2X2 PATELLOFEMORAL PAIN SYNDROME OF LEFT KNEE: ICD-10-CM

## 2024-06-05 NOTE — PROGRESS NOTES
[facility]   Physical Therapy Treatment Note      Name: Leanna Escobar  Clinic Number: [id number]    Therapy Diagnosis:   Encounter Diagnoses   Name Primary?    Decreased strength of lower extremity Yes    Pain aggravated by activities of daily living     Patellofemoral pain syndrome of left knee      Physician: Roque Stevenson MD     Physician Orders: PT Eval and Treat   Medical Diagnosis from Referral: Patellofemoral pain syndrome of left knee [M22.2X2]   Evaluation Date: 5/29/2024  Authorization Period Expiration: 12/31/2024  Plan of Care Expiration: 8/29/24  Progress Note Due: 6/29/24  Visit # / Visits authorized: 1/ 10     Time In: 1110  Time Out: 1208  Total Billable Time: 54 minutes Billing reflects 1:1 direct supervision    MD follow-up:    Precautions: Standard, hemophilia    FOTO:  Goal: 71%  -Intake: 40%  -Status: incomplete  -Discharge: incomplete    Subjective     Pt reports: The knee is feeling OK. She hasn't been having much pain, but she still feels some discomfort..  She was compliant with home exercise program.  Response to previous treatment: Improving symptoms  Functional change: Gradually improving function    Pain: minimal/10  Location: left anterior knee     Objective      Objective Measures updated at progress report unless specified otherwise.    Problem List:  Lumbar extension/rotation syndrome  Hip adduction/internal rotation syndrome  Lumbopelvic-hip complex weakness/movement dysfunction  Hamstring dominance  Patella franny    Treatment     Leanna received the treatments listed below:      CPT Code Intervention Date/Notes  6/5   MT Manual Therapy     TE Bike 5 min   NR Abdominal cuff progression Bent knee fallout 15x   NR Quadruped glute isolation 15x   NR Bridge with TB 15x   NR Side lying hip abduction 15x ea   NR SLS hold with opp knee at hip height 3 x 30s ea   TE BFR shuttle 1.0 band 30/15/15/15     14 minutes of Therapeutic Exercise (TE) to develop  strength, endurance, ROM, and flexibility. (01708)  00 minutes of Manual therapy (MT) to improve pain and ROM. (17137)  40 minutes of Neuromuscular Re-Education (NMR)  to improve: Balance, Coordination, Kinesthetic, Sense, Proprioception, and Posture. (72009)  00 minutes of Therapeutic Activities (TA) to improve functional performance. (36912)  Unattended Electrical Stimulation (ES) for muscle performance and/or pain modulation. (03848)  Vasopneumatic Device Therapy () for management of swelling/edema. (69693)  BFR: Blood flow restriction applied during exercise  NP: Not Performed      Patient Education and Home Exercises         Education provided:   Physical therapy diagnosis, prognosis, and plan of care.     Written Home Exercises Provided: Patient instructed to cont prior HEP.  Exercises were reviewed and Leanna was able to demonstrate them prior to the end of the session.  Leanna demonstrated good  understanding of the education provided.     See EMR under Patient Instructions for exercises provided prior visit.    Assessment     Patient continues to have discomfort with closed chain knee load. We progressed hip and core stability motor control exercises today which she did very well with. Cuing required to maintain neutral pelvis. Some discomfort with initiation of BFR single leg squat but we were able to modify exercise to improve tolerance.    Leanna Is progressing well towards her goals.   Pt prognosis is Excellent.     Pt will continue to benefit from skilled outpatient physical therapy to address the deficits listed in the problem list box on initial evaluation, provide pt/family education and to maximize pt's level of independence in the home and community environment.     Pt's spiritual, cultural and educational needs considered and pt agreeable to plan of care and goals.    Anticipated barriers to physical therapy: co-morbidities     Goals:     Short Term Goals:  6 weeks Status  Date Met   PAIN: Pt  will report worst pain of 4/10 in order to progress toward max functional ability and improve quality of life. [x] Progressing  [] Met  [] Not Met     FUNCTION: Patient will demonstrate improved function as indicated by a functional score improvement of at least 5 points on FOTO. [x] Progressing  [] Met  [] Not Met     MOBILITY: Patient will improve AROM to 50% of stated goals, listed in objective measures above, in order to progress towards independence with functional activities.  [x] Progressing  [] Met  [] Not Met     STRENGTH: Patient will improve strength to 50% of stated goals, listed in objective measures above, in order to progress towards independence with functional activities. [x] Progressing  [] Met  [] Not Met     POSTURE: Patient will correct postural deviations in sitting and standing, to decrease pain and promote long term stability.  [x] Progressing  [] Met  [] Not Met     GAIT: Patient will demonstrate improved gait mechanics including more symmetrical foot progression angle in order to improve functional mobility, improve quality of life, and decrease risk of further injury or fall.  [x] Progressing  [] Met  [] Not Met     HEP: Patient will demonstrate independence with HEP in order to progress toward functional independence. [x] Progressing  [] Met  [] Not Met        Long Term Goals:  12 weeks Status Date Met   PAIN: Pt will report worst pain of 0/10 in order to progress toward max functional ability and improve quality of life [x] Progressing  [] Met  [] Not Met     FUNCTION: Patient will demonstrate improved function as indicated by a FOTO functional score improvement as listed in header. [x] Progressing  [] Met  [] Not Met     MOBILITY: Patient will improve AROM to stated goals, listed in objective measures above, in order to return to maximal functional potential and improve quality of life.  [x] Progressing  [] Met  [] Not Met     STRENGTH: Patient will improve strength to stated goals,  listed in objective measures above, in order to improve functional independence and quality of life.  [x] Progressing  [] Met  [] Not Met     GAIT: Patient will demonstrate normalized gait mechanics with minimal compensation in order to return to PLOF. [x] Progressing  [] Met  [] Not Met     Patient will return to normal ADL's, IADL's, community involvement, recreational activities, and work-related activities with less than or equal to 0/10 pain and maximal function.  [x] Progressing  [] Met  [] Not Met          Plan       Continue to progress per protocol and per patient tolerance      Julio Day, PT

## 2024-06-10 ENCOUNTER — CLINICAL SUPPORT (OUTPATIENT)
Facility: HOSPITAL | Age: 24
End: 2024-06-10
Payer: COMMERCIAL

## 2024-06-10 DIAGNOSIS — R29.898 DECREASED STRENGTH OF LOWER EXTREMITY: Primary | ICD-10-CM

## 2024-06-10 DIAGNOSIS — M22.2X2 PATELLOFEMORAL PAIN SYNDROME OF LEFT KNEE: ICD-10-CM

## 2024-06-10 DIAGNOSIS — R52 PAIN AGGRAVATED BY ACTIVITIES OF DAILY LIVING: ICD-10-CM

## 2024-06-10 PROCEDURE — 97110 THERAPEUTIC EXERCISES: CPT | Mod: PN | Performed by: PHYSICAL THERAPIST

## 2024-06-10 PROCEDURE — 97112 NEUROMUSCULAR REEDUCATION: CPT | Mod: PN | Performed by: PHYSICAL THERAPIST

## 2024-06-10 NOTE — PROGRESS NOTES
[facility]   Physical Therapy Treatment Note      Name: Leanna Escobar  Clinic Number: [id number]    Therapy Diagnosis:   Encounter Diagnoses   Name Primary?    Decreased strength of lower extremity Yes    Pain aggravated by activities of daily living     Patellofemoral pain syndrome of left knee      Physician: Roque Stevenson MD     Physician Orders: PT Eval and Treat   Medical Diagnosis from Referral: Patellofemoral pain syndrome of left knee [M22.2X2]   Evaluation Date: 5/29/2024  Authorization Period Expiration: 12/31/2024  Plan of Care Expiration: 8/29/24  Progress Note Due: 6/29/24  Visit # / Visits authorized: 2/ 10     Time In: 1114  Time Out: 1210  Total Billable Time: 48 minutes Billing reflects 1:1 direct supervision    MD follow-up:    Precautions: Standard, hemophilia    FOTO:  Goal: 71%  -Intake: 40%  -Status: incomplete  -Discharge: incomplete    Subjective     Pt reports: 6/10 - Her knee is feeling pretty good, she still feels intermittent discomfort, but she hasnt been having a lot of swelling.  She was compliant with home exercise program.  Response to previous treatment: Improving symptoms  Functional change: Gradually improving function    Pain: minimal/10  Location: left anterior knee     Objective      Objective Measures updated at progress report unless specified otherwise.    Problem List:  Lumbar extension/rotation syndrome  Hip adduction/internal rotation syndrome  Lumbopelvic-hip complex weakness/movement dysfunction  Hamstring dominance  Patella franny    Treatment     Leanna received the treatments listed below:      CPT Code Intervention Date/Notes  6/10   MT Manual Therapy     TE Bike 5 min   NR Abdominal cuff progression NP   NR Quadruped glute isolation NP   NR Bridge with TB 15x   NR SLS hold with opp knee at hip height np   TE BFR shuttle 1.0 band 30/15/15/15   TE BFR Ancore 5# 30/15/15/15   NR Lateral band walk RTB 3 x 10 yds   NR Side plnak hip  abduction 3 x 10      00 minutes of Therapeutic Exercise (TE) to develop strength, endurance, ROM, and flexibility. (18649)  24 minutes of Manual therapy (MT) to improve pain and ROM. (92030)  30 minutes of Neuromuscular Re-Education (NMR)  to improve: Balance, Coordination, Kinesthetic, Sense, Proprioception, and Posture. (88800)  00 minutes of Therapeutic Activities (TA) to improve functional performance. (71418)  Unattended Electrical Stimulation (ES) for muscle performance and/or pain modulation. (45818)  Vasopneumatic Device Therapy () for management of swelling/edema. (50419)  BFR: Blood flow restriction applied during exercise  NP: Not Performed      Patient Education and Home Exercises         Education provided:   Physical therapy diagnosis, prognosis, and plan of care.     Written Home Exercises Provided: Patient instructed to cont prior HEP.  Exercises were reviewed and Leanna was able to demonstrate them prior to the end of the session.  Leanna demonstrated good  understanding of the education provided.     See EMR under Patient Instructions for exercises provided prior visit.    Assessment     6/10 - Patient was noted to have apparent left anterior inomminate rotation. Improved with manual therapy. Good tolerance all hip core exercises, as well as progression of thigh strengthening exercises.    Leanna Is progressing well towards her goals.   Pt prognosis is Excellent.     Pt will continue to benefit from skilled outpatient physical therapy to address the deficits listed in the problem list box on initial evaluation, provide pt/family education and to maximize pt's level of independence in the home and community environment.     Pt's spiritual, cultural and educational needs considered and pt agreeable to plan of care and goals.    Anticipated barriers to physical therapy: co-morbidities     Goals:     Short Term Goals:  6 weeks Status  Date Met   PAIN: Pt will report worst pain of 4/10 in order to  progress toward max functional ability and improve quality of life. [x] Progressing  [] Met  [] Not Met     FUNCTION: Patient will demonstrate improved function as indicated by a functional score improvement of at least 5 points on FOTO. [x] Progressing  [] Met  [] Not Met     MOBILITY: Patient will improve AROM to 50% of stated goals, listed in objective measures above, in order to progress towards independence with functional activities.  [x] Progressing  [] Met  [] Not Met     STRENGTH: Patient will improve strength to 50% of stated goals, listed in objective measures above, in order to progress towards independence with functional activities. [x] Progressing  [] Met  [] Not Met     POSTURE: Patient will correct postural deviations in sitting and standing, to decrease pain and promote long term stability.  [x] Progressing  [] Met  [] Not Met     GAIT: Patient will demonstrate improved gait mechanics including more symmetrical foot progression angle in order to improve functional mobility, improve quality of life, and decrease risk of further injury or fall.  [x] Progressing  [] Met  [] Not Met     HEP: Patient will demonstrate independence with HEP in order to progress toward functional independence. [x] Progressing  [] Met  [] Not Met        Long Term Goals:  12 weeks Status Date Met   PAIN: Pt will report worst pain of 0/10 in order to progress toward max functional ability and improve quality of life [x] Progressing  [] Met  [] Not Met     FUNCTION: Patient will demonstrate improved function as indicated by a FOTO functional score improvement as listed in header. [x] Progressing  [] Met  [] Not Met     MOBILITY: Patient will improve AROM to stated goals, listed in objective measures above, in order to return to maximal functional potential and improve quality of life.  [x] Progressing  [] Met  [] Not Met     STRENGTH: Patient will improve strength to stated goals, listed in objective measures above, in order  to improve functional independence and quality of life.  [x] Progressing  [] Met  [] Not Met     GAIT: Patient will demonstrate normalized gait mechanics with minimal compensation in order to return to PLOF. [x] Progressing  [] Met  [] Not Met     Patient will return to normal ADL's, IADL's, community involvement, recreational activities, and work-related activities with less than or equal to 0/10 pain and maximal function.  [x] Progressing  [] Met  [] Not Met          Plan       Continue to progress per protocol and per patient tolerance      Julio Day, PT

## 2024-06-17 RX ORDER — METHYLPREDNISOLONE ACETATE 40 MG/ML
40 INJECTION, SUSPENSION INTRA-ARTICULAR; INTRALESIONAL; INTRAMUSCULAR; SOFT TISSUE
Status: DISCONTINUED | OUTPATIENT
Start: 2024-05-23 | End: 2024-06-17 | Stop reason: HOSPADM

## 2024-06-20 ENCOUNTER — CLINICAL SUPPORT (OUTPATIENT)
Facility: HOSPITAL | Age: 24
End: 2024-06-20
Payer: COMMERCIAL

## 2024-06-20 DIAGNOSIS — R52 PAIN AGGRAVATED BY ACTIVITIES OF DAILY LIVING: ICD-10-CM

## 2024-06-20 DIAGNOSIS — M22.2X2 PATELLOFEMORAL PAIN SYNDROME OF LEFT KNEE: ICD-10-CM

## 2024-06-20 DIAGNOSIS — R29.898 DECREASED STRENGTH OF LOWER EXTREMITY: Primary | ICD-10-CM

## 2024-06-20 PROCEDURE — 97110 THERAPEUTIC EXERCISES: CPT | Mod: PN | Performed by: PHYSICAL THERAPIST

## 2024-06-20 PROCEDURE — 97140 MANUAL THERAPY 1/> REGIONS: CPT | Mod: PN | Performed by: PHYSICAL THERAPIST

## 2024-06-20 PROCEDURE — 97112 NEUROMUSCULAR REEDUCATION: CPT | Mod: PN | Performed by: PHYSICAL THERAPIST

## 2024-06-25 NOTE — PROGRESS NOTES
[facility]   Physical Therapy Treatment Note      Name: Leanna Escobar  Clinic Number: [id number]    Therapy Diagnosis:   Encounter Diagnoses   Name Primary?    Decreased strength of lower extremity Yes    Pain aggravated by activities of daily living     Patellofemoral pain syndrome of left knee      Physician: Roque Stevenson MD     Physician Orders: PT Eval and Treat   Medical Diagnosis from Referral: Patellofemoral pain syndrome of left knee [M22.2X2]   Evaluation Date: 5/29/2024  Authorization Period Expiration: 12/31/2024  Plan of Care Expiration: 8/29/24  Progress Note Due: 6/29/24  Visit # / Visits authorized: 3/ 10     Time In: 0950  Time Out: 1055  Total Billable Time: 56 minutes Billing reflects 1:1 direct supervision    MD follow-up:    Precautions: Standard, hemophilia    FOTO:  Goal: 71%  -Intake: 40%  -Status: incomplete  -Discharge: incomplete    Subjective     Pt reports: 6/20- Feeling good today. Knee hasn't been bothering her too much. She did have some discomfort when playing with her niece  She was compliant with home exercise program.  Response to previous treatment: Improving symptoms  Functional change: Gradually improving function    Pain: minimal/10  Location: left anterior knee     Objective      Objective Measures updated at progress report unless specified otherwise.    Problem List:  Lumbar extension/rotation syndrome  Hip adduction/internal rotation syndrome  Lumbopelvic-hip complex weakness/movement dysfunction  Hamstring dominance  Patella franny    Treatment     Leanna received the treatments listed below:      CPT Code Intervention Date/Notes  6/20   MT Manual Therapy  SIJ distraction Gr5   TE Bike 5 min   NR Abdominal cuff progression March  Bent knee fallout   NR Bridge  With march 2 min   NR Wall clamshell GTB 3 x 10   TE BFR shuttle 1.0 band 30/15/15/15   TE BFR Ancore 5# 30/15/15/15   NR Lateral band walk GTB 3 x 10 yds   NR Side plank hip abduction  3 x 30 s ea       24 minutes of Therapeutic Exercise (TE) to develop strength, endurance, ROM, and flexibility. (73721)  08 minutes of Manual therapy (MT) to improve pain and ROM. (38011)  24 minutes of Neuromuscular Re-Education (NMR)  to improve: Balance, Coordination, Kinesthetic, Sense, Proprioception, and Posture. (82105)  00 minutes of Therapeutic Activities (TA) to improve functional performance. (73728)  Unattended Electrical Stimulation (ES) for muscle performance and/or pain modulation. (56517)  Vasopneumatic Device Therapy () for management of swelling/edema. (52546)  BFR: Blood flow restriction applied during exercise  NP: Not Performed      Patient Education and Home Exercises         Education provided:   Physical therapy diagnosis, prognosis, and plan of care.     Written Home Exercises Provided: Patient instructed to cont prior HEP.  Exercises were reviewed and Leanna was able to demonstrate them prior to the end of the session.  Leanna demonstrated good  understanding of the education provided.     See EMR under Patient Instructions for exercises provided prior visit.    Assessment     6/20- Pt noted to have mild pelvic girdle dysfunction today that was improved with manual therapy. She continues to have intermittent symptoms but is progressing well with exercise program.    Leanna Is progressing well towards her goals.   Pt prognosis is Excellent.     Pt will continue to benefit from skilled outpatient physical therapy to address the deficits listed in the problem list box on initial evaluation, provide pt/family education and to maximize pt's level of independence in the home and community environment.     Pt's spiritual, cultural and educational needs considered and pt agreeable to plan of care and goals.    Anticipated barriers to physical therapy: co-morbidities     Goals:     Short Term Goals:  6 weeks Status  Date Met   PAIN: Pt will report worst pain of 4/10 in order to progress toward max  functional ability and improve quality of life. [x] Progressing  [] Met  [] Not Met     FUNCTION: Patient will demonstrate improved function as indicated by a functional score improvement of at least 5 points on FOTO. [x] Progressing  [] Met  [] Not Met     MOBILITY: Patient will improve AROM to 50% of stated goals, listed in objective measures above, in order to progress towards independence with functional activities.  [x] Progressing  [] Met  [] Not Met     STRENGTH: Patient will improve strength to 50% of stated goals, listed in objective measures above, in order to progress towards independence with functional activities. [x] Progressing  [] Met  [] Not Met     POSTURE: Patient will correct postural deviations in sitting and standing, to decrease pain and promote long term stability.  [x] Progressing  [] Met  [] Not Met     GAIT: Patient will demonstrate improved gait mechanics including more symmetrical foot progression angle in order to improve functional mobility, improve quality of life, and decrease risk of further injury or fall.  [x] Progressing  [] Met  [] Not Met     HEP: Patient will demonstrate independence with HEP in order to progress toward functional independence. [x] Progressing  [] Met  [] Not Met        Long Term Goals:  12 weeks Status Date Met   PAIN: Pt will report worst pain of 0/10 in order to progress toward max functional ability and improve quality of life [x] Progressing  [] Met  [] Not Met     FUNCTION: Patient will demonstrate improved function as indicated by a FOTO functional score improvement as listed in header. [x] Progressing  [] Met  [] Not Met     MOBILITY: Patient will improve AROM to stated goals, listed in objective measures above, in order to return to maximal functional potential and improve quality of life.  [x] Progressing  [] Met  [] Not Met     STRENGTH: Patient will improve strength to stated goals, listed in objective measures above, in order to improve functional  independence and quality of life.  [x] Progressing  [] Met  [] Not Met     GAIT: Patient will demonstrate normalized gait mechanics with minimal compensation in order to return to PLOF. [x] Progressing  [] Met  [] Not Met     Patient will return to normal ADL's, IADL's, community involvement, recreational activities, and work-related activities with less than or equal to 0/10 pain and maximal function.  [x] Progressing  [] Met  [] Not Met          Plan       Continue to progress per protocol and per patient tolerance      Julio Day, PT

## 2024-11-25 ENCOUNTER — OFFICE VISIT (OUTPATIENT)
Facility: CLINIC | Age: 24
End: 2024-11-25
Payer: COMMERCIAL

## 2024-11-25 ENCOUNTER — HOSPITAL ENCOUNTER (OUTPATIENT)
Dept: RADIOLOGY | Facility: HOSPITAL | Age: 24
Discharge: HOME | End: 2024-11-25
Attending: ORTHOPAEDIC SURGERY
Payer: COMMERCIAL

## 2024-11-25 VITALS
BODY MASS INDEX: 24.26 KG/M2 | HEIGHT: 68 IN | HEART RATE: 86 BPM | WEIGHT: 160.06 LBS | DIASTOLIC BLOOD PRESSURE: 78 MMHG | SYSTOLIC BLOOD PRESSURE: 117 MMHG

## 2024-11-25 DIAGNOSIS — M22.2X2 PATELLOFEMORAL PAIN SYNDROME OF BOTH KNEES: ICD-10-CM

## 2024-11-25 DIAGNOSIS — M25.561 RIGHT KNEE PAIN, UNSPECIFIED CHRONICITY: Primary | ICD-10-CM

## 2024-11-25 DIAGNOSIS — M25.561 RIGHT KNEE PAIN, UNSPECIFIED CHRONICITY: ICD-10-CM

## 2024-11-25 DIAGNOSIS — M22.2X1 PATELLOFEMORAL PAIN SYNDROME OF BOTH KNEES: ICD-10-CM

## 2024-11-25 DIAGNOSIS — M25.461 EFFUSION OF BURSA OF RIGHT KNEE: ICD-10-CM

## 2024-11-25 PROCEDURE — 73564 X-RAY EXAM KNEE 4 OR MORE: CPT | Mod: 26,RT,, | Performed by: RADIOLOGY

## 2024-11-25 PROCEDURE — 73564 X-RAY EXAM KNEE 4 OR MORE: CPT | Mod: TC,PN,RT

## 2024-11-25 PROCEDURE — 99999 PR PBB SHADOW E&M-EST. PATIENT-LVL IV: CPT | Mod: PBBFAC,,, | Performed by: PHYSICIAN ASSISTANT

## 2024-11-25 RX ADMIN — METHYLPREDNISOLONE ACETATE 80 MG: 80 INJECTION, SUSPENSION INTRA-ARTICULAR; INTRALESIONAL; INTRAMUSCULAR; SOFT TISSUE at 11:11

## 2024-11-25 NOTE — PROGRESS NOTES
Patient ID: Leanna Escobar  YOB: 2000  MRN: [id number]    Chief Complaint: Pain of the Right Knee    Referred By: Ortho Walk In     History of Present Illness: Leanna Escobar is a  24 y.o. female    diadetic with a chief complaint of Pain of the Right Knee    Leanna presents with knee pain and swelling in her right knee, similar to symptoms she experienced in her left knee in May, which were treated by Dr. Stevenson. The current symptoms in her right knee started without any specific injury, fall, or twist. The pain is described as a dull ache with significant pressure discomfort. The discomfort is present when the leg is elevated and not bearing weight, but becomes painful when walking or squatting. The pain is localized to the outer right side of the knee, possibly involving the IT band.    She has been using a knee brace given by her sister but finds it difficult to use at work. She mentions having spinal fusions in the past, which helps maintain good posture. She expresses concern about understanding the cause of symptoms and potential preventive measures.    Regarding previous treatment in May, she received a steroid injection, did physical therapy, and was prescribed OTC ibuprofen. She has not had issues with her left knee since that treatment. She tries to avoid taking ibuprofen regularly due to a potential bleeding disorder, stating that she has never been officially diagnosed but her mother is a carrier of hemophilia.    She denies any specific injury, fall, or twist to the knee. She denies being diabetic or having high blood pressure.      Previous HPI on 24 with Dr. Roque Stevenson  Patient presents today for evaluation of left knee pain and swelling that has been present for 10 days without any specific injury. She denies any known hx of autoimmune disorders. She has an extensive hx of spinal fusions dating back to 2019, these were done in [place] and [facility]. She  reports she was at work when she started to notice the pain while walking around. She denies any twisting or falls, endorses insidious onset. She has taken ibuprofen as needed for the pain and iced a few times with mild improvement. She has the most pain with general weightbearing and walking longer distances at work. She has not had any CSI's or formal PT for this issue.         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  - Work includes proper patient transportation  - Knee pain affects ability to squat, especially when wearing a knee brace  - Physical nature of job, including lifting and moving patients, potentially contributing to knee issues    Past Medical History:   Past Medical History:   Diagnosis Date    Breakdown (mechanical) of cranial or spinal infusion catheter, initial encounter     History of tonsillectomy      Past Surgical History:   Procedure Laterality Date    SPINAL FUSION      TONSILLECTOMY       Family History   Problem Relation Name Age of Onset    Heart attack Paternal Grandfather      Heart attack Paternal Grandmother      Breast cancer Maternal Grandmother      Heart disease Maternal Grandfather      Hypertension Father      Hyperlipidemia Father      Clotting disorder Mother       Social History     Socioeconomic History    Marital status:    Tobacco Use    Smoking status: Every Day     Types: Vaping with nicotine     Passive exposure: Never    Smokeless tobacco: Never   Substance and Sexual Activity    Alcohol use: Yes     Comment: occasional    Drug use: Never    Sexual activity: Yes     Partners: Male     Birth control/protection: I.U.D.     Comment: Carlos     Medication List with Changes/Refills   New Medications    DICLOFENAC SODIUM (VOLTAREN ARTHRITIS PAIN) 1 % GEL    Apply 2 g topically 4 (four) times daily.    Current Medications    CLORAZEPATE (TRANXENE) 3.75 MG TAB    Take 3.75 mg by mouth 3 (three) times daily.    CLORAZEPATE (TRANXENE) 7.5 MG TAB    Take 7.5 mg by mouth.    DULOXETINE (CYMBALTA) 60 MG CAPSULE    Take 60 mg by mouth once daily.    ETHYNODIOL-ETHINYL ESTRADIOL (KELNOR) 1-35 MG-MCG PER TABLET    Take 1 tablet by mouth once daily. Taking continuously skipping sugar pills    LEVONORGESTREL (KYLEENA) 19.5 MG IUD    Take by intrauterine route.    NORTRIPTYLINE (PAMELOR) 10 MG CAPSULE    Take 10 mg by mouth every evening.    ONDANSETRON (ZOFRAN) 8 MG TABLET         Review of patient's allergies indicates:   Allergen Reactions    Adhesive Blisters, Hives, Itching, Rash and Swelling    Beeswax Hives, Rash and Swelling    Triclosan Itching     Review of Systems   Constitutional: Negative for chills and fever.   HENT:  Negative for sore throat.    Eyes:  Negative for pain.   Cardiovascular:  Negative for chest pain and leg swelling.   Respiratory:  Negative for cough and shortness of breath.    Skin:  Negative for itching and rash.   Musculoskeletal:  Positive for joint pain, joint swelling and myalgias.   Gastrointestinal:  Negative for abdominal pain, nausea and vomiting.   Genitourinary:  Negative for dysuria.   Neurological:  Negative for dizziness, numbness and paresthesias.       Physical Exam:   Body mass index is 24.34 kg/m².  Vitals:    11/25/24 1122   BP: 117/78   Pulse: 86      GENERAL: Well appearing, appropriate for stated age, no acute distress.  CARDIOVASCULAR: Pulses regular by peripheral palpation.  PULMONARY: Respirations are even and non-labored.  NEURO: Awake, alert, and oriented x 3.  PSYCH: Mood & affect are appropriate.  HEENT: Head is normocephalic and atraumatic.  General    Nursing note and vitals reviewed.          Right Knee Exam   Right knee exam is normal.    Inspection   Effusion: present    Tenderness   The patient is tender to palpation of the patella (quad tendon).    Range of  Motion   Extension:  0   Flexion:  140     Tests   Ligament Examination   Lachman: normal (-1 to 2mm)   PCL-Posterior Drawer: normal (0 to 2mm)     MCL - Valgus: normal (0 to 2mm)  LCL - Varus: normal  Patella   Patellar Grind: positive  J-Sign: present    Other   Sensation: normal    Left Knee Exam   Left knee exam is normal.    Inspection   Effusion: absent    Tenderness   The patient is experiencing no tenderness.     Range of Motion   Extension:  0   Flexion:  140     Tests   Stability   Lachman: normal (-1 to 2mm)   PCL-Posterior Drawer: normal (0 to 2mm)  MCL - Valgus: normal (0 to 2mm)  LCL - Varus: normal (0 to 2mm)    Other   Sensation: normal    Muscle Strength   Right Lower Extremity   Hip Abduction: 5/5   Quadriceps:  5/5   Hamstrin/5   Left Lower Extremity   Hip Abduction: 5/5   Quadriceps:  5/5   Hamstrin/5     Vascular Exam     Right Pulses  Dorsalis Pedis:      2+  Posterior Tibial:      2+        Left Pulses  Dorsalis Pedis:      2+  Posterior Tibial:      2+          Physical Exam    Musculoskeletal: Normal leg extension.             Imaging:    X-ray Knee Ortho Right with Flexion  Narrative: EXAM: XR KNEE ORTHO RIGHT WITH FLEXION    CLINICAL HISTORY: Pain    FINDINGS:  No fracture, dislocation or joint effusion is identified.  Joint spaces are maintained.  Impression:  No evidence of acute or recent injury.    Finalized on: 2024 4:28 PM By:  Bay HARVEY# [id number]      2024 16:30:[id number]    WES        Relevant imaging results reviewed and interpreted by me, discussed with the patient and / or family today.     Other Tests:      Patient Instructions   Assessment:  Leanna Escobar is a  24 y.o. 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   Right knee pain and swelling    Encounter Diagnoses   Name Primary?    Right knee pain, unspecified chronicity Yes    Patellofemoral pain  syndrome of both knees     Effusion of bursa of right knee       Plan:  Lifestyle:  Recommend using a compressive knee brace that can be worn under clothes.  Advised doing home exercises to strengthen core muscles, glute muscles, and ab muscles.  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    Medications:  Prescribed Voltaren gel, a topical anti-inflammatory cream, to be applied on the affected knee.  Recommend taking Tylenol as needed for pain.    Procedure: Right knee CSI today 2/2/40  Discussed steroid injection for the right knee. Explained that the injection contains lidocaine, marcaine, and steroid. Warned about potential blood pressure and blood sugar increase due to steroid. Obtained patient's consent for the procedure. Planned to administer the injection during the current visit after checking blood pressure.     Follow-up: 6-8 weeks or sooner if there are any problems between now and then.    Leave Review:   Google: Leave Google Review  Healthgrades: Leave Healthgrades Review    After Hours Number: [phone]        Provider Note/Medical Decision Making:   Under my direction and supervision, 10 minutes were spent sizing, fitting, and educating regarding durable medical equipment by an assistant today.  CPT 75975.    I discussed worrisome and red flag signs and symptoms with the patient. The patient expressed understanding and agreed to alert me immediately or to go to the emergency room if they experience any of these.   Treatment plan was developed with input from the patient/family, and they expressed understanding and agreement with the plan. All questions were answered today.      Tammie Marquez PA-C  Sports Medicine Physician Assistant     Disclaimer: This note was prepared using a voice recognition system and is likely to have sound alike errors within the text.     This note was generated with the assistance of ambient listening technology. Verbal  consent was obtained by the patient and accompanying visitor(s) for the recording of patient appointment to facilitate this note. I attest to having reviewed and edited the generated note for accuracy, though some syntax or spelling errors may persist. Please contact the author of this note for any clarification.

## 2024-11-26 RX ORDER — DICLOFENAC SODIUM 10 MG/G
2 GEL TOPICAL 4 TIMES DAILY
Qty: 1 EACH | Refills: 1 | Status: SHIPPED | OUTPATIENT
Start: 2024-11-26 | End: 2024-11-26

## 2024-11-26 RX ORDER — METHYLPREDNISOLONE ACETATE 80 MG/ML
80 INJECTION, SUSPENSION INTRA-ARTICULAR; INTRALESIONAL; INTRAMUSCULAR; SOFT TISSUE
Status: DISCONTINUED | OUTPATIENT
Start: 2024-11-25 | End: 2024-11-26 | Stop reason: HOSPADM

## 2024-11-26 NOTE — PATIENT INSTRUCTIONS
Assessment:  Leanna Escobar is a  24 y.o. 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   Right knee pain and swelling    Encounter Diagnoses   Name Primary?    Right knee pain, unspecified chronicity Yes    Patellofemoral pain syndrome of both knees     Effusion of bursa of right knee       Plan:  Lifestyle:  Recommend using a compressive knee brace that can be worn under clothes.  Advised doing home exercises to strengthen core muscles, glute muscles, and ab muscles.  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    Medications:  Prescribed Voltaren gel, a topical anti-inflammatory cream, to be applied on the affected knee.  Recommend taking Tylenol as needed for pain.    Procedure: Right knee CSI today 40  Discussed steroid injection for the right knee. Explained that the injection contains lidocaine, marcaine, and steroid. Warned about potential blood pressure and blood sugar increase due to steroid. Obtained patient's consent for the procedure. Planned to administer the injection during the current visit after checking blood pressure.     I had a long discussion with the patient about the causes, treatments, and prognosis for patellofemoral pain syndrome. I described the articulation of the patella within the trochlea and described the various ways that patellofemoral pain manifests. I discussed the importance of the hip external rotators, hip abductors, and core muscles to ensure proper knee stability and improve patellar tracking. We went over the fact that although there is not an easy solution for patellofemoral chondrosis, most patellofemoral pain, patellar tendonitis, and inflamed patellofemoral fat pad/bursitis will improve with physical therapy, although it may take 6-10 weeks.     Follow-up: 6-8 weeks or sooner if there are any problems between now and  then.    Leave Review:   Google: Leave Google Review  Healthgrades: Leave Healthgrades Review    After Hours Number: [phone]

## 2024-11-26 NOTE — PROCEDURES
Large Joint Aspiration/Injection: R knee    Date/Time: 11/25/2024 11:00 AM    Performed by: Tammie Hansen PA-C  Authorized by: Tammie Hansen PA-C    Consent Done?:  Yes (Verbal)  Indications:  Joint swelling and pain  Site marked: the procedure site was marked    Timeout: prior to procedure the correct patient, procedure, and site was verified    Prep: patient was prepped and draped in usual sterile fashion      Local anesthesia used?: Yes    Anesthesia:  Local infiltration  Local anesthetic:  Bupivacaine 0.5% without epinephrine, lidocaine 1% without epinephrine, topical anesthetic and lidocaine spray    Details:  Needle Size:  22 G  Approach:  Superior  Location:  Knee  Site:  R knee  Medications:  80 mg methylPREDNISolone acetate 80 mg/mL  Patient tolerance:  Patient tolerated the procedure well with no immediate complications     2cc 1% lidocaine plain, 2cc 0.5% marcaine plain, 0.5cc 80mg methylprednisolone    Procedure Note:  We discussed the risk and benefits of injections, including pain, infection, bleeding, damage to adjacent structures, risk of reaction to injection. We discussed the steroid/cortisone injections will not heal the problem but mat help decrease inflammation and help with symptoms. We discussed the risk of repeated injections. The patient expressed understanding and wanted to proceed with the injection. We performed a timeout to verify the proper patient, proper procedure, and the proper site. The injection site was prepared in a sterile fashion. The patient tolerated it well and there were no complication. We did discuss with the patient that steroid injections can cause some increase in blood sugar and blood pressure for up to a week after the injection.

## 2025-01-12 ENCOUNTER — OFFICE VISIT (OUTPATIENT)
Dept: URGENT CARE | Facility: CLINIC | Age: 25
End: 2025-01-12
Payer: COMMERCIAL

## 2025-01-12 VITALS
TEMPERATURE: 98 F | HEIGHT: 68 IN | SYSTOLIC BLOOD PRESSURE: 121 MMHG | DIASTOLIC BLOOD PRESSURE: 77 MMHG | RESPIRATION RATE: 18 BRPM | WEIGHT: 158 LBS | HEART RATE: 79 BPM | OXYGEN SATURATION: 98 % | BODY MASS INDEX: 23.95 KG/M2

## 2025-01-12 DIAGNOSIS — R30.0 DYSURIA: ICD-10-CM

## 2025-01-12 DIAGNOSIS — R10.32 LEFT LOWER QUADRANT ABDOMINAL PAIN: ICD-10-CM

## 2025-01-12 DIAGNOSIS — R11.2 NAUSEA AND VOMITING, UNSPECIFIED VOMITING TYPE: ICD-10-CM

## 2025-01-12 DIAGNOSIS — R10.9 FLANK PAIN, ACUTE: ICD-10-CM

## 2025-01-12 DIAGNOSIS — N20.0 KIDNEY STONE ON LEFT SIDE: Primary | ICD-10-CM

## 2025-01-12 LAB
BILIRUBIN, UA POC OHS: ABNORMAL
BLOOD, UA POC OHS: ABNORMAL
CLARITY, UA POC OHS: CLEAR
COLOR, UA POC OHS: YELLOW
GLUCOSE, UA POC OHS: NEGATIVE
KETONES, UA POC OHS: NEGATIVE
LEUKOCYTES, UA POC OHS: NEGATIVE
NITRITE, UA POC OHS: NEGATIVE
PH, UA POC OHS: 6
PROTEIN, UA POC OHS: 100
SPECIFIC GRAVITY, UA POC OHS: >=1.03
UROBILINOGEN, UA POC OHS: 1

## 2025-01-12 PROCEDURE — 99214 OFFICE O/P EST MOD 30 MIN: CPT | Mod: 25,S$GLB,,

## 2025-01-12 PROCEDURE — 96372 THER/PROPH/DIAG INJ SC/IM: CPT | Mod: S$GLB,,,

## 2025-01-12 PROCEDURE — 81003 URINALYSIS AUTO W/O SCOPE: CPT | Mod: QW,S$GLB,,

## 2025-01-12 PROCEDURE — S0119 ONDANSETRON 4 MG: HCPCS | Mod: S$GLB,,,

## 2025-01-12 RX ORDER — KETOROLAC TROMETHAMINE 30 MG/ML
60 INJECTION, SOLUTION INTRAMUSCULAR; INTRAVENOUS ONCE
Status: COMPLETED | OUTPATIENT
Start: 2025-01-12 | End: 2025-01-12

## 2025-01-12 RX ORDER — KETOROLAC TROMETHAMINE 30 MG/ML
30 INJECTION, SOLUTION INTRAMUSCULAR; INTRAVENOUS
Status: DISCONTINUED | OUTPATIENT
Start: 2025-01-12 | End: 2025-01-12

## 2025-01-12 RX ORDER — KETOROLAC TROMETHAMINE 30 MG/ML
60 INJECTION, SOLUTION INTRAMUSCULAR; INTRAVENOUS
Status: DISCONTINUED | OUTPATIENT
Start: 2025-01-12 | End: 2025-01-12

## 2025-01-12 RX ORDER — ONDANSETRON 4 MG/1
4 TABLET, ORALLY DISINTEGRATING ORAL
Status: COMPLETED | OUTPATIENT
Start: 2025-01-12 | End: 2025-01-12

## 2025-01-12 RX ORDER — KETOROLAC TROMETHAMINE 30 MG/ML
60 INJECTION, SOLUTION INTRAMUSCULAR; INTRAVENOUS ONCE
Status: DISCONTINUED | OUTPATIENT
Start: 2025-01-12 | End: 2025-01-12

## 2025-01-12 RX ORDER — ONDANSETRON 4 MG/1
4 TABLET, ORALLY DISINTEGRATING ORAL 2 TIMES DAILY
Qty: 6 TABLET | Refills: 0 | Status: SHIPPED | OUTPATIENT
Start: 2025-01-12 | End: 2025-01-15

## 2025-01-12 RX ORDER — KETOROLAC TROMETHAMINE 30 MG/ML
30 INJECTION, SOLUTION INTRAMUSCULAR; INTRAVENOUS ONCE
Status: DISCONTINUED | OUTPATIENT
Start: 2025-01-12 | End: 2025-01-12

## 2025-01-12 RX ORDER — KETOROLAC TROMETHAMINE 10 MG/1
10 TABLET, FILM COATED ORAL EVERY 6 HOURS
Qty: 20 TABLET | Refills: 0 | Status: SHIPPED | OUTPATIENT
Start: 2025-01-12 | End: 2025-01-17

## 2025-01-12 RX ADMIN — KETOROLAC TROMETHAMINE 60 MG: 30 INJECTION, SOLUTION INTRAMUSCULAR; INTRAVENOUS at 10:01

## 2025-01-12 RX ADMIN — ONDANSETRON 4 MG: 4 TABLET, ORALLY DISINTEGRATING ORAL at 10:01

## 2025-01-12 NOTE — PROGRESS NOTES
"Subjective:      Patient ID: Leanna Escobar is a 24 y.o. female.    Vitals:  height is 5' 8" (1.727 m) and weight is 71.7 kg (158 lb). Her oral temperature is 97.7 °F (36.5 °C). Her blood pressure is 121/77 and her pulse is 79. Her respiration is 18 and oxygen saturation is 98%.     Chief Complaint: Abdominal Pain    Pt c/o severe sudden onset abdominal pain that radiates to back also vomiting and dizziness that started today. No medications taking for symptoms. Pain 8/10. Pain comes and goes.     Abdominal Pain  This is a new problem. The current episode started today. The problem occurs constantly. The pain is located in the RLQ and LLQ. The pain is at a severity of 8/10. The pain is severe. The quality of the pain is aching and sharp. The abdominal pain radiates to the right flank, left flank and back. Associated symptoms include a fever, nausea and vomiting. Nothing aggravates the pain. The pain is relieved by Nothing. She has tried nothing for the symptoms. Her past medical history is significant for irritable bowel syndrome.       Constitution: Positive for fever. Negative for chills.   HENT:  Negative for congestion.    Cardiovascular:  Negative for chest pain and palpitations.   Respiratory:  Negative for cough.    Gastrointestinal:  Positive for abdominal pain, nausea and vomiting.   Skin:  Negative for rash.      Objective:     Physical Exam   Constitutional: She is oriented to person, place, and time. She appears ill. normal  HENT:   Head: Normocephalic and atraumatic.   Eyes: Pupils are equal, round, and reactive to light.   Cardiovascular: Normal rate, regular rhythm, normal heart sounds and normal pulses.   Pulmonary/Chest: Effort normal and breath sounds normal.   Abdominal: She exhibits no distension and no mass. There is abdominal tenderness. There is guarding and left CVA tenderness. There is no rebound. No hernia.   Musculoskeletal: Normal range of motion.         General: Normal range of motion. "   Neurological: She is alert and oriented to person, place, and time.   Skin: Skin is warm and dry. Capillary refill takes less than 2 seconds.   Psychiatric: Mood normal.       Assessment:     1. Kidney stone on left side    2. Left lower quadrant abdominal pain    3. Flank pain, acute    4. Dysuria    5. Nausea and vomiting, unspecified vomiting type          Plan:       Kidney stone on left side  -     ketorolac injection 60 mg  -     ketorolac (TORADOL) 10 mg tablet; Take 1 tablet (10 mg total) by mouth every 6 (six) hours. for 5 days  Dispense: 20 tablet; Refill: 0    Left lower quadrant abdominal pain  -     ketorolac injection 60 mg  -     ketorolac (TORADOL) 10 mg tablet; Take 1 tablet (10 mg total) by mouth every 6 (six) hours. for 5 days  Dispense: 20 tablet; Refill: 0    Flank pain, acute  -     ketorolac injection 60 mg  -     ketorolac (TORADOL) 10 mg tablet; Take 1 tablet (10 mg total) by mouth every 6 (six) hours. for 5 days  Dispense: 20 tablet; Refill: 0    Dysuria  -     POCT Urinalysis(Instrument)    Nausea and vomiting, unspecified vomiting type  -     ondansetron disintegrating tablet 4 mg  -     ondansetron (ZOFRAN-ODT) 4 MG TbDL; Take 1 tablet (4 mg total) by mouth 2 (two) times daily. for 3 days  Dispense: 6 tablet; Refill: 0          Medical Decision Making:   Urgent Care Management:  UA and symptoms indicate possible kidney stone. IM toradol 60mg administered in office, as well as PO zofran. Rx toradol to start tomr and zofran. Hat and filter given to pt, as well as explanation and handout for kidney stone. ER if s/s worsen. Follow up with PCP in 3 days if no improvement.

## 2025-03-10 ENCOUNTER — OFFICE VISIT (OUTPATIENT)
Dept: URGENT CARE | Facility: CLINIC | Age: 25
End: 2025-03-10
Payer: COMMERCIAL

## 2025-03-10 VITALS
BODY MASS INDEX: 24.46 KG/M2 | DIASTOLIC BLOOD PRESSURE: 77 MMHG | OXYGEN SATURATION: 98 % | HEIGHT: 68 IN | SYSTOLIC BLOOD PRESSURE: 110 MMHG | RESPIRATION RATE: 17 BRPM | HEART RATE: 94 BPM | WEIGHT: 161.38 LBS | TEMPERATURE: 98 F

## 2025-03-10 DIAGNOSIS — U07.1 COVID-19: Primary | ICD-10-CM

## 2025-03-10 DIAGNOSIS — Z72.0 ENGAGES IN NICOTINE CONTAINING SUBSTANCE VAPING: ICD-10-CM

## 2025-03-10 DIAGNOSIS — R05.9 COUGH, UNSPECIFIED TYPE: ICD-10-CM

## 2025-03-10 LAB
CTP QC/QA: YES
CTP QC/QA: YES
POC MOLECULAR INFLUENZA A AGN: NEGATIVE
POC MOLECULAR INFLUENZA B AGN: NEGATIVE
SARS CORONAVIRUS 2 ANTIGEN: POSITIVE

## 2025-03-10 PROCEDURE — 99214 OFFICE O/P EST MOD 30 MIN: CPT | Mod: S$GLB,,, | Performed by: NURSE PRACTITIONER

## 2025-03-10 PROCEDURE — 87811 SARS-COV-2 COVID19 W/OPTIC: CPT | Mod: QW,S$GLB,, | Performed by: NURSE PRACTITIONER

## 2025-03-10 PROCEDURE — 87502 INFLUENZA DNA AMP PROBE: CPT | Mod: QW,S$GLB,, | Performed by: NURSE PRACTITIONER

## 2025-03-10 RX ORDER — PROMETHAZINE HYDROCHLORIDE AND DEXTROMETHORPHAN HYDROBROMIDE 6.25; 15 MG/5ML; MG/5ML
5 SYRUP ORAL EVERY 6 HOURS PRN
Qty: 120 ML | Refills: 0 | Status: SHIPPED | OUTPATIENT
Start: 2025-03-10

## 2025-03-10 RX ORDER — BENZONATATE 200 MG/1
200 CAPSULE ORAL 3 TIMES DAILY PRN
Qty: 30 CAPSULE | Refills: 1 | Status: SHIPPED | OUTPATIENT
Start: 2025-03-10

## 2025-03-10 RX ORDER — IPRATROPIUM BROMIDE 42 UG/1
2 SPRAY, METERED NASAL 4 TIMES DAILY PRN
Qty: 15 ML | Refills: 1 | Status: SHIPPED | OUTPATIENT
Start: 2025-03-10

## 2025-03-10 NOTE — PATIENT INSTRUCTIONS
If you test positive for COVID-19 you may return to normal activities when, for at least 24 hours, both are true:     Your symptoms are getting better overall, and:  You have not had a fever AND are not using fever reducing medication     The CDC also recommends added precautions in the 5 days after 03/14/2025 return to normal activity including frequent hand washing, mask wearing thru 03/19/2025, physical distancing.      They also recommend should the patient develop a fever or starts to feel worse after they have returned to normal activities, they should return home and away from others for at least another 24 hours. The link below is a direct link to the CDC with all this information.     https://www.cdc.gov/respiratory-viruses/prevention/precautions-when-sick.html     Increase fluids Get plenty of rest.   General infection control measures--cover mouth with sneezing coughing, wash hands frequently   Rest activity ad sameera   Tylenol or advil per package directions for fever body aches   OTC cough and cold preparations or prescribed cough congestion suspension;   Tessalon perles for daytime work mgmt of cough; do not take at same time with cough suspension   Atrovent nasal spray as needed for congestion/post nasal drip   Supportive care measures   Warm salt water gargles for throat comfort  Covid infections usually resolve in 10 days; If symptoms persist or worsen follow up UC or PCP

## 2025-03-10 NOTE — PROGRESS NOTES
"Subjective:      Patient ID: Leanna Escobar is a 25 y.o. female.    Vitals:  height is 5' 8" (1.727 m) and weight is 73.2 kg (161 lb 6.4 oz). Her oral temperature is 98.4 °F (36.9 °C). Her blood pressure is 110/77 and her pulse is 94. Her respiration is 17 and oxygen saturation is 98%.     Chief Complaint: Sinus Problem    24 yo female presents to clinic with complaints of cough, sneezing, nasal congestion, runny nose, body aches and headaches that started yesterday.  Pt states max temp of 99.8 yesterday.  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 The current episode started yesterday. The problem is unchanged. There has been no fever. Associated symptoms include chills, congestion, coughing, headaches, sinus pressure and sneezing. Pertinent negatives include no sore throat. Past treatments include oral decongestants. The treatment provided no relief.       Constitution: Positive for chills.   HENT:  Positive for congestion and sinus pressure. Negative for sore throat.    Respiratory:  Positive for cough.    Allergic/Immunologic: Positive for sneezing.   Neurological:  Positive for headaches.      Objective:     Vitals:    03/10/25 0839   BP: 110/77   BP Location: Left arm   Patient Position: Sitting   Pulse: 94   Resp: 17   Temp: 98.4 °F (36.9 °C)   TempSrc: Oral   SpO2: 98%   Weight: 73.2 kg (161 lb 6.4 oz)   Height: 5' 8" (1.727 m)       Physical Exam   Constitutional: She is oriented to person, place, and time. She appears well-developed. She is cooperative.  Non-toxic appearance. She does not appear ill. No distress.   HENT:   Head: Normocephalic and atraumatic.   Ears:   Right Ear: Hearing, tympanic membrane, external ear and ear canal normal.   Left Ear: Hearing, tympanic membrane, external ear and ear canal normal.   Nose: Congestion present. No mucosal edema, rhinorrhea or nasal deformity. No epistaxis. Right sinus exhibits no " maxillary sinus tenderness and no frontal sinus tenderness. Left sinus exhibits no maxillary sinus tenderness and no frontal sinus tenderness.   Mouth/Throat: Uvula is midline, oropharynx is clear and moist and mucous membranes are normal. Mucous membranes are moist. No trismus in the jaw. Normal dentition. No uvula swelling. No oropharyngeal exudate, posterior oropharyngeal edema or posterior oropharyngeal erythema.   Eyes: Conjunctivae and lids are normal. No scleral icterus.   Neck: Trachea normal and phonation normal. Neck supple. No edema present. No erythema present. No neck rigidity present.   Cardiovascular: Normal rate, regular rhythm, normal heart sounds and normal pulses.   Pulmonary/Chest: Effort normal and breath sounds normal. No respiratory distress. She has no decreased breath sounds. She has no rhonchi.   Abdominal: Normal appearance and bowel sounds are normal. Soft. There is no abdominal tenderness.   Musculoskeletal: Normal range of motion.         General: No deformity. Normal range of motion.   Neurological: no focal deficit. She is alert and oriented to person, place, and time. She displays no weakness. She exhibits normal muscle tone. Coordination normal.   Skin: Skin is warm, dry, intact, not diaphoretic and not pale. Capillary refill takes less than 2 seconds.   Psychiatric: Her speech is normal and behavior is normal. Judgment and thought content normal.   Nursing note and vitals reviewed.      Assessment:     1. COVID-19    2. Cough, unspecified type      Results for orders placed or performed in visit on 03/10/25   SARS Coronavirus 2 Antigen, POCT Manual Read    Collection Time: 03/10/25  8:48 AM   Result Value Ref Range    SARS Coronavirus 2 Antigen Positive (A) Negative, Presumptive Negative     Acceptable Yes    POCT Influenza A/B MOLECULAR    Collection Time: 03/10/25  8:52 AM   Result Value Ref Range    POC Molecular Influenza A Ag Negative Negative    POC Molecular  Influenza B Ag Negative Negative     Acceptable Yes        Plan:   Viral URI, influenza,  Covid 19   POSITIVE covid 19   And NEGATIVE  influenza POCT   PE noted congestion and dry cough;   DX COVID 19;    Plan: d/c home with supportive care and measures   Low risk for complications/hospitalization so paxlovid deferred   3 scripts sent pharmacy       Assistance with smoking cessation was offered, including:  []  Medications  [x]  Counseling  []  Printed Information on Smoking Cessation  [x]  Referral to a Smoking Cessation Program  Patient was counseled regarding smoking for 3-10minutes.  Declined smoking cessation  tools    COVID-19  -     promethazine-dextromethorphan (PROMETHAZINE-DM) 6.25-15 mg/5 mL Syrp; Take 5 mLs by mouth every 6 (six) hours as needed (cough and congestion).  Dispense: 120 mL; Refill: 0  -     benzonatate (TESSALON) 200 MG capsule; Take 1 capsule (200 mg total) by mouth 3 (three) times daily as needed for Cough.  Dispense: 30 capsule; Refill: 1  -     ipratropium (ATROVENT) 42 mcg (0.06 %) nasal spray; 2 sprays by Each Nostril route 4 (four) times daily as needed for Rhinitis (congestion).  Dispense: 15 mL; Refill: 1    Cough, unspecified type  -     POCT Influenza A/B MOLECULAR  -     SARS Coronavirus 2 Antigen, POCT Manual Read  -     promethazine-dextromethorphan (PROMETHAZINE-DM) 6.25-15 mg/5 mL Syrp; Take 5 mLs by mouth every 6 (six) hours as needed (cough and congestion).  Dispense: 120 mL; Refill: 0  -     benzonatate (TESSALON) 200 MG capsule; Take 1 capsule (200 mg total) by mouth 3 (three) times daily as needed for Cough.  Dispense: 30 capsule; Refill: 1      Patient Instructions      If you test positive for COVID-19 you may return to normal activities when, for at least 24 hours, both are true:     Your symptoms are getting better overall, and:  You have not had a fever AND are not using fever reducing medication     The CDC also recommends added precautions in the 5  days after 03/14/2025 return to normal activity including frequent hand washing, mask wearing thru 03/19/2025, physical distancing.      They also recommend should the patient develop a fever or starts to feel worse after they have returned to normal activities, they should return home and away from others for at least another 24 hours. The link below is a direct link to the CDC with all this information.     https://www.cdc.gov/respiratory-viruses/prevention/precautions-when-sick.html     Increase fluids Get plenty of rest.   General infection control measures--cover mouth with sneezing coughing, wash hands frequently   Rest activity ad sameera   Tylenol or advil per package directions for fever body aches   OTC cough and cold preparations or prescribed cough congestion suspension;   Tessalon perles for daytime work mgmt of cough; do not take at same time with cough suspension   Atrovent nasal spray as needed for congestion/post nasal drip   Supportive care measures   Warm salt water gargles for throat comfort  Covid infections usually resolve in 10 days; If symptoms persist or worsen follow up UC or PCP         No follow-ups on file.

## 2025-03-10 NOTE — LETTER
March 10, 2025      [facility]  [address], [address]  [facility] [address]  Phone: [phone]  Fax: [phone]       Patient: Leanna Escobar   YOB: 2000  Date of Visit: 03/10/2025    To Whom It May Concern:    Florence Escobar  was at [facility] on 03/10/2025. The patient may return to work/school on 03/15/2025 with restrictions ending 03/19/2025.  Please excuse associated absences.       If you have any questions or concerns, or if I can be of further assistance, please do not hesitate to contact me.    Sincerely,          Vielka Dolna NP